# Patient Record
Sex: FEMALE | Race: BLACK OR AFRICAN AMERICAN | NOT HISPANIC OR LATINO | URBAN - METROPOLITAN AREA
[De-identification: names, ages, dates, MRNs, and addresses within clinical notes are randomized per-mention and may not be internally consistent; named-entity substitution may affect disease eponyms.]

---

## 2020-07-22 ENCOUNTER — OFFICE VISIT (OUTPATIENT)
Dept: URBAN - METROPOLITAN AREA CLINIC 113 | Facility: CLINIC | Age: 40
End: 2020-07-22

## 2020-07-25 ENCOUNTER — TELEPHONE ENCOUNTER (OUTPATIENT)
Dept: URBAN - METROPOLITAN AREA CLINIC 13 | Facility: CLINIC | Age: 40
End: 2020-07-25

## 2020-07-26 ENCOUNTER — TELEPHONE ENCOUNTER (OUTPATIENT)
Dept: URBAN - METROPOLITAN AREA CLINIC 13 | Facility: CLINIC | Age: 40
End: 2020-07-26

## 2020-07-26 RX ORDER — METHOCARBAMOL 750 MG/1
TAKE 1 TABLET BY MOUTH EVERY 6 HOURS AS NEEDED TABLET, FILM COATED ORAL
Qty: 20 | Refills: 0 | Status: ACTIVE | COMMUNITY
Start: 2020-04-15

## 2020-07-26 RX ORDER — HYDROXYZINE HYDROCHLORIDE 10 MG/1
TK 1 TO 2 TS PO QHS PRN TABLET ORAL
Qty: 60 | Refills: 0 | Status: ACTIVE | COMMUNITY
Start: 2020-06-29

## 2020-07-26 RX ORDER — OXYCODONE AND ACETAMINOPHEN 5; 325 MG/1; MG/1
TAKE 1 TABLET BY MOUTH EVERY 8 HOURS AS NEEDED FOR PAIN TABLET ORAL
Qty: 90 | Refills: 0 | Status: ACTIVE | COMMUNITY
Start: 2020-07-08

## 2020-07-26 RX ORDER — DICLOFENAC SODIUM 75 MG/1
TAKE 1 TABLET BY MOUTH EVERY 12 HOURS AS NEEDED FOR PAIN TABLET, DELAYED RELEASE ORAL
Qty: 20 | Refills: 0 | Status: ACTIVE | COMMUNITY
Start: 2020-04-15

## 2020-07-26 RX ORDER — ADALIMUMAB 40MG/0.8ML
INJECT 40MG SUBCUTANEOUSLY *ONCE PER WEEK AS DIRECTED KIT SUBCUTANEOUS
Qty: 4 | Refills: 11 | Status: ACTIVE | COMMUNITY

## 2020-07-26 RX ORDER — WHEAT DEXTRIN 3 G/4 G
TAKE 1 TBSP DAILY POWDER (GRAM) ORAL
Refills: 0 | Status: ACTIVE | COMMUNITY
Start: 2008-02-05

## 2020-07-26 RX ORDER — ACETAMINOPHEN AND CODEINE PHOSPHATE 300; 30 MG/1; MG/1
TAKE 1 TABLET BY MOUTH EVERY 6 HOURS AS NEEDED FOR PAIN TABLET ORAL
Qty: 20 | Refills: 0 | Status: ACTIVE | COMMUNITY
Start: 2020-04-15

## 2020-07-26 RX ORDER — HYDROCODONE BITARTRATE AND ACETAMINOPHEN 5; 325 MG/1; MG/1
TAKE 1 TABLET PO EVERY 6 HOURS PRN TABLET ORAL
Qty: 12 | Refills: 0 | Status: ACTIVE | COMMUNITY
Start: 2020-05-12

## 2020-07-26 RX ORDER — CLINDAMYCIN HYDROCHLORIDE 300 MG/1
TAKE 1 CAPSULE BY MOUTH 3 TIMES A DAY FOR 7 DAYS CAPSULE ORAL
Qty: 21 | Refills: 0 | Status: ACTIVE | COMMUNITY
Start: 2020-04-15

## 2020-07-26 RX ORDER — SILVER SULFADIAZINE 10 MG/G
APPLY TO AFFECTED AREA TWICE A DAY CREAM TOPICAL
Qty: 50 | Refills: 0 | Status: ACTIVE | COMMUNITY
Start: 2020-04-15

## 2020-07-26 RX ORDER — DOXYCYCLINE 100 MG/1
TAKE 1 CAPSULE BY MOUTH TWICE A DAY CAPSULE ORAL
Qty: 20 | Refills: 0 | Status: ACTIVE | COMMUNITY
Start: 2020-04-25

## 2020-07-26 RX ORDER — HYDROCORTISONE ACETATE 1500 MG/15G
TAKE RECTALLY ONCE DAILY AEROSOL, FOAM TOPICAL
Refills: 0 | Status: ACTIVE | COMMUNITY
Start: 2009-12-24

## 2020-10-22 ENCOUNTER — OFFICE VISIT (OUTPATIENT)
Dept: URBAN - METROPOLITAN AREA CLINIC 113 | Facility: CLINIC | Age: 40
End: 2020-10-22
Payer: COMMERCIAL

## 2020-10-22 ENCOUNTER — WEB ENCOUNTER (OUTPATIENT)
Dept: URBAN - METROPOLITAN AREA CLINIC 113 | Facility: CLINIC | Age: 40
End: 2020-10-22

## 2020-10-22 VITALS
DIASTOLIC BLOOD PRESSURE: 100 MMHG | RESPIRATION RATE: 18 BRPM | WEIGHT: 188 LBS | HEIGHT: 65 IN | SYSTOLIC BLOOD PRESSURE: 139 MMHG | BODY MASS INDEX: 31.32 KG/M2 | HEART RATE: 108 BPM | TEMPERATURE: 98 F

## 2020-10-22 DIAGNOSIS — K51.30 ULCERATIVE RECTOSIGMOIDITIS WITHOUT COMPLICATION: ICD-10-CM

## 2020-10-22 DIAGNOSIS — L88 PYODERMA GANGRENOSUM: ICD-10-CM

## 2020-10-22 DIAGNOSIS — K59.01 SLOW TRANSIT CONSTIPATION: ICD-10-CM

## 2020-10-22 PROCEDURE — 99213 OFFICE O/P EST LOW 20 MIN: CPT | Performed by: INTERNAL MEDICINE

## 2020-10-22 RX ORDER — LISDEXAMFETAMINE DIMESYLATE 30 MG/1
1 CAPSULE IN THE MORNING CAPSULE ORAL ONCE A DAY
Status: ACTIVE | COMMUNITY

## 2020-10-22 RX ORDER — OXYCODONE AND ACETAMINOPHEN 5; 325 MG/1; MG/1
TAKE 1 TABLET BY MOUTH EVERY 8 HOURS AS NEEDED FOR PAIN TABLET ORAL
Qty: 90 | Refills: 0 | Status: ACTIVE | COMMUNITY
Start: 2020-07-08

## 2020-10-22 RX ORDER — ADALIMUMAB 40MG/0.8ML
INJECT 40MG SUBCUTANEOUSLY *ONCE PER WEEK AS DIRECTED KIT SUBCUTANEOUS
Qty: 4 | Refills: 11 | Status: ACTIVE | COMMUNITY

## 2020-10-22 RX ORDER — SPIRONOLACTONE 100 MG/1
1 TABLET TABLET, FILM COATED ORAL ONCE A DAY
Status: ACTIVE | COMMUNITY

## 2020-10-22 RX ORDER — ZOLPIDEM TARTRATE 10 MG/1
1 TABLET AT BEDTIME AS NEEDED TABLET, FILM COATED ORAL ONCE A DAY
Status: ACTIVE | COMMUNITY

## 2020-10-22 RX ORDER — DAPSONE 100 MG/1
1 TABLET TABLET ORAL ONCE A DAY
Status: ACTIVE | COMMUNITY

## 2020-10-22 RX ORDER — DOXYCYCLINE 100 MG/1
TAKE 1 CAPSULE BY MOUTH TWICE A DAY CAPSULE ORAL
Qty: 20 | Refills: 0 | Status: ACTIVE | COMMUNITY
Start: 2020-04-25

## 2020-10-22 RX ORDER — ADALIMUMAB 40MG/0.8ML
INJECT 40MG SUBCUTANEOUSLY *ONCE PER WEEK AS DIRECTED KIT SUBCUTANEOUS
OUTPATIENT

## 2020-10-22 RX ORDER — DROSPIRENONE AND ETHINYL ESTRADIOL 0.02-3(28)
1 TABLET KIT ORAL ONCE A DAY
Status: ACTIVE | COMMUNITY

## 2020-10-22 NOTE — PHYSICAL EXAM GASTROINTESTINAL
Message sent via [employer].      Estrella Laura CMA  [facility]  [place]       soft, nontender, nondistended , normal bowel sounds

## 2020-10-22 NOTE — HPI-OTHER HISTORIES
Colonoscopy (1/7/10): diffuse rectosigmoid inflammation, biopsied showing chronic acitve colitis with ulceration, no dysplasia.

## 2020-10-22 NOTE — HPI-TODAY'S VISIT:
Ms. Medel is a 40-year-old woman with ulcerative proctitis on Humria presenting for follow-up regarding constipation. She was initially seen on 7/22/2020 for ulcerative colitis that was asymptomatic on Humira 40 mg qweek started for the treatment of pyoderma gangrenosum.  She was having nausea at that time she was recommended to get labs and a right upper quadrant ultrasound.  Abdominal ultrasound on 7/31/2020 was normal. Since her last appointment she reports issues of irregular bowel movements where she will only have 1 bowel movement weekly.  She was evaluated in the emergency department in May for evaluation of left leg pain secondary to pyoderma gangrenosum.  She is followed by Dr. Zapata dermatology treated with prednisone, clindamycin and dapsone, then started on Humira 40 mg every week.  She has tried taking MiraLAX daily in the past without any improvement.  She denies any red blood per rectum or abdominal pain.  She also denies any nausea, vomiting, heartburn or difficulty swallowing.

## 2020-11-14 ENCOUNTER — TELEPHONE ENCOUNTER (OUTPATIENT)
Dept: URBAN - METROPOLITAN AREA CLINIC 113 | Facility: CLINIC | Age: 40
End: 2020-11-14

## 2021-02-02 ENCOUNTER — OFFICE VISIT (OUTPATIENT)
Dept: URBAN - METROPOLITAN AREA CLINIC 113 | Facility: CLINIC | Age: 41
End: 2021-02-02
Payer: COMMERCIAL

## 2021-02-02 ENCOUNTER — WEB ENCOUNTER (OUTPATIENT)
Dept: URBAN - METROPOLITAN AREA CLINIC 113 | Facility: CLINIC | Age: 41
End: 2021-02-02

## 2021-02-02 VITALS
HEIGHT: 65 IN | HEART RATE: 98 BPM | BODY MASS INDEX: 30.82 KG/M2 | WEIGHT: 185 LBS | TEMPERATURE: 97.1 F | DIASTOLIC BLOOD PRESSURE: 86 MMHG | SYSTOLIC BLOOD PRESSURE: 130 MMHG

## 2021-02-02 DIAGNOSIS — K59.01 SLOW TRANSIT CONSTIPATION: ICD-10-CM

## 2021-02-02 DIAGNOSIS — L88 PYODERMA GANGRENOSUM: ICD-10-CM

## 2021-02-02 DIAGNOSIS — K51.30 ULCERATIVE RECTOSIGMOIDITIS WITHOUT COMPLICATION: ICD-10-CM

## 2021-02-02 DIAGNOSIS — Z12.11 COLON CANCER SCREENING: ICD-10-CM

## 2021-02-02 DIAGNOSIS — D75.89 MACROCYTOSIS: ICD-10-CM

## 2021-02-02 PROCEDURE — 99214 OFFICE O/P EST MOD 30 MIN: CPT | Performed by: INTERNAL MEDICINE

## 2021-02-02 RX ORDER — DAPSONE 100 MG/1
1 TABLET TABLET ORAL ONCE A DAY
Status: ACTIVE | COMMUNITY

## 2021-02-02 RX ORDER — OXYCODONE AND ACETAMINOPHEN 5; 325 MG/1; MG/1
TAKE 1 TABLET BY MOUTH EVERY 8 HOURS AS NEEDED FOR PAIN TABLET ORAL
Qty: 90 | Refills: 0 | Status: ACTIVE | COMMUNITY
Start: 2020-07-08

## 2021-02-02 RX ORDER — LINACLOTIDE 145 UG/1
1 CAPSULE AT LEAST 30 MINUTES BEFORE THE FIRST MEAL OF THE DAY ON AN EMPTY STOMACH CAPSULE, GELATIN COATED ORAL ONCE A DAY
Qty: 30 | OUTPATIENT
Start: 2021-02-02 | End: 2021-03-04

## 2021-02-02 RX ORDER — SPIRONOLACTONE 100 MG/1
1 TABLET TABLET, FILM COATED ORAL ONCE A DAY
Status: ACTIVE | COMMUNITY

## 2021-02-02 RX ORDER — HYDROXYZINE HYDROCHLORIDE 10 MG/1
1 TABLET TABLET, FILM COATED ORAL ONCE DAILY
Status: ACTIVE | COMMUNITY

## 2021-02-02 RX ORDER — ZOLPIDEM TARTRATE 10 MG/1
1 TABLET AT BEDTIME AS NEEDED TABLET, FILM COATED ORAL ONCE A DAY
Status: ACTIVE | COMMUNITY

## 2021-02-02 RX ORDER — CHLORTHALIDONE 25 MG/1
1 TABLET IN THE MORNING WITH FOOD TABLET ORAL ONCE A DAY
Status: ACTIVE | COMMUNITY

## 2021-02-02 RX ORDER — GABAPENTIN 300 MG/1
3 CAPSULE CAPSULE ORAL ONCE A DAY
Status: ACTIVE | COMMUNITY

## 2021-02-02 RX ORDER — LISDEXAMFETAMINE DIMESYLATE 30 MG/1
1 CAPSULE IN THE MORNING CAPSULE ORAL ONCE A DAY
Status: ACTIVE | COMMUNITY

## 2021-02-02 RX ORDER — ADALIMUMAB 40MG/0.8ML
INJECT 40MG SUBCUTANEOUSLY *ONCE PER WEEK AS DIRECTED KIT SUBCUTANEOUS
Status: ACTIVE | COMMUNITY

## 2021-02-02 RX ORDER — ADALIMUMAB 40MG/0.8ML
INJECT 40MG SUBCUTANEOUSLY *ONCE PER WEEK AS DIRECTED KIT SUBCUTANEOUS
OUTPATIENT

## 2021-02-02 RX ORDER — BUSPIRONE HYDROCHLORIDE 10 MG/1
1 TABLET PRN TABLET ORAL
Status: ACTIVE | COMMUNITY

## 2021-02-02 RX ORDER — DOXYCYCLINE 100 MG/1
TAKE 1 CAPSULE BY MOUTH TWICE A DAY CAPSULE ORAL
Qty: 20 | Refills: 0 | Status: ACTIVE | COMMUNITY
Start: 2020-04-25

## 2021-02-02 NOTE — HPI-TODAY'S VISIT:
Ms. Medel is a 40-year-old woman with a history of ulcerative proctitis, hidradenitis suppurativa, pyoderma gangrenosum on Humira 40 mg weekly and constipation presenting for follow-up. She was last seen on 10/22/2020 for follow-up regarding her chronic ulcerative rectosigmoiditis and pyoderma gangrenosum and was in clinical remission on Humira 40 mg weekly, and constipation was treated with Linzess 145 mcg daily. She returns for follow-up reporting that she is overall doing much better.  Her constipation is controlled on Linzess 145 mcg daily.  She has not had any issues with her pyoderma gangrenosum.  Her hidradenitis suppurativa is also improved. she denies any red blood per rectum, joint aches, unintentional weight loss.  She also denies any nausea, vomiting, heartburn or difficulty swallowing.  She reports having some fatigue recently. Labs on 12/10/2020 showed normal basic metabolic panel, normal liver function tests, TSH 0.297. Labs on 10/19/2020 show WBC 5.5, hemoglobin 12.5, , platelets 282

## 2021-02-09 ENCOUNTER — TELEPHONE ENCOUNTER (OUTPATIENT)
Dept: URBAN - METROPOLITAN AREA CLINIC 113 | Facility: CLINIC | Age: 41
End: 2021-02-09

## 2021-02-09 RX ORDER — BUSPIRONE HYDROCHLORIDE 10 MG/1
1 TABLET PRN TABLET ORAL
Status: ACTIVE | COMMUNITY

## 2021-02-09 RX ORDER — SPIRONOLACTONE 100 MG/1
1 TABLET TABLET, FILM COATED ORAL ONCE A DAY
Status: ACTIVE | COMMUNITY

## 2021-02-09 RX ORDER — ZOLPIDEM TARTRATE 10 MG/1
1 TABLET AT BEDTIME AS NEEDED TABLET, FILM COATED ORAL ONCE A DAY
Status: ACTIVE | COMMUNITY

## 2021-02-09 RX ORDER — HYDROXYZINE HYDROCHLORIDE 10 MG/1
1 TABLET TABLET, FILM COATED ORAL ONCE DAILY
Status: ACTIVE | COMMUNITY

## 2021-02-09 RX ORDER — LISDEXAMFETAMINE DIMESYLATE 30 MG/1
1 CAPSULE IN THE MORNING CAPSULE ORAL ONCE A DAY
Status: ACTIVE | COMMUNITY

## 2021-02-09 RX ORDER — OXYCODONE AND ACETAMINOPHEN 5; 325 MG/1; MG/1
TAKE 1 TABLET BY MOUTH EVERY 8 HOURS AS NEEDED FOR PAIN TABLET ORAL
Qty: 90 | Refills: 0 | Status: ACTIVE | COMMUNITY
Start: 2020-07-08

## 2021-02-09 RX ORDER — DOXYCYCLINE 100 MG/1
TAKE 1 CAPSULE BY MOUTH TWICE A DAY CAPSULE ORAL
Qty: 20 | Refills: 0 | Status: ACTIVE | COMMUNITY
Start: 2020-04-25

## 2021-02-09 RX ORDER — CHLORTHALIDONE 25 MG/1
1 TABLET IN THE MORNING WITH FOOD TABLET ORAL ONCE A DAY
Status: ACTIVE | COMMUNITY

## 2021-02-09 RX ORDER — ADALIMUMAB 40MG/0.8ML
INJECT 40MG SUBCUTANEOUSLY *ONCE PER WEEK AS DIRECTED KIT SUBCUTANEOUS
Status: ACTIVE | COMMUNITY

## 2021-02-09 RX ORDER — GABAPENTIN 300 MG/1
3 CAPSULE CAPSULE ORAL ONCE A DAY
Status: ACTIVE | COMMUNITY

## 2021-02-09 RX ORDER — LINACLOTIDE 145 UG/1
1 CAPSULE AT LEAST 30 MINUTES BEFORE THE FIRST MEAL OF THE DAY ON AN EMPTY STOMACH CAPSULE, GELATIN COATED ORAL ONCE A DAY
Qty: 30 | Status: ACTIVE | COMMUNITY
Start: 2021-02-02 | End: 2021-03-04

## 2021-02-09 RX ORDER — DAPSONE 100 MG/1
1 TABLET TABLET ORAL ONCE A DAY
Status: ACTIVE | COMMUNITY

## 2021-03-22 ENCOUNTER — LAB OUTSIDE AN ENCOUNTER (OUTPATIENT)
Dept: URBAN - METROPOLITAN AREA CLINIC 113 | Facility: CLINIC | Age: 41
End: 2021-03-22

## 2021-06-07 ENCOUNTER — TELEPHONE ENCOUNTER (OUTPATIENT)
Dept: URBAN - METROPOLITAN AREA CLINIC 113 | Facility: CLINIC | Age: 41
End: 2021-06-07

## 2021-06-29 ENCOUNTER — OFFICE VISIT (OUTPATIENT)
Dept: URBAN - METROPOLITAN AREA CLINIC 113 | Facility: CLINIC | Age: 41
End: 2021-06-29
Payer: COMMERCIAL

## 2021-06-29 VITALS
BODY MASS INDEX: 29.99 KG/M2 | RESPIRATION RATE: 20 BRPM | TEMPERATURE: 97.3 F | SYSTOLIC BLOOD PRESSURE: 128 MMHG | HEART RATE: 96 BPM | DIASTOLIC BLOOD PRESSURE: 91 MMHG | HEIGHT: 65 IN | WEIGHT: 180 LBS

## 2021-06-29 DIAGNOSIS — K59.01 SLOW TRANSIT CONSTIPATION: ICD-10-CM

## 2021-06-29 DIAGNOSIS — Z12.11 COLON CANCER SCREENING: ICD-10-CM

## 2021-06-29 DIAGNOSIS — K51.30 ULCERATIVE RECTOSIGMOIDITIS WITHOUT COMPLICATION: ICD-10-CM

## 2021-06-29 DIAGNOSIS — D75.89 MACROCYTOSIS: ICD-10-CM

## 2021-06-29 DIAGNOSIS — L88 PYODERMA GANGRENOSUM: ICD-10-CM

## 2021-06-29 PROCEDURE — 99214 OFFICE O/P EST MOD 30 MIN: CPT | Performed by: INTERNAL MEDICINE

## 2021-06-29 RX ORDER — LINACLOTIDE 145 UG/1
1 CAPSULE AT LEAST 30 MINUTES BEFORE THE FIRST MEAL OF THE DAY ON AN EMPTY STOMACH CAPSULE, GELATIN COATED ORAL ONCE A DAY
Qty: 90 | Refills: 3 | OUTPATIENT
Start: 2021-06-29 | End: 2022-06-24

## 2021-06-29 RX ORDER — ZOLPIDEM TARTRATE 10 MG/1
1 TABLET AT BEDTIME AS NEEDED TABLET, FILM COATED ORAL ONCE A DAY
Status: ACTIVE | COMMUNITY

## 2021-06-29 RX ORDER — ADALIMUMAB 40MG/0.8ML
INJECT 40MG SUBCUTANEOUSLY *ONCE PER WEEK AS DIRECTED KIT SUBCUTANEOUS
Status: ACTIVE | COMMUNITY

## 2021-06-29 RX ORDER — LISDEXAMFETAMINE DIMESYLATE 30 MG/1
1 CAPSULE IN THE MORNING CAPSULE ORAL ONCE A DAY
Status: ACTIVE | COMMUNITY

## 2021-06-29 RX ORDER — BUSPIRONE HYDROCHLORIDE 10 MG/1
1 TABLET PRN TABLET ORAL
Status: ACTIVE | COMMUNITY

## 2021-06-29 RX ORDER — LINACLOTIDE 145 UG/1
1 CAPSULE AT LEAST 30 MINUTES BEFORE THE FIRST MEAL OF THE DAY ON AN EMPTY STOMACH CAPSULE, GELATIN COATED ORAL ONCE A DAY
Qty: 90 | Refills: 3 | OUTPATIENT
End: 2021-06-29

## 2021-06-29 RX ORDER — DOXYCYCLINE 100 MG/1
TAKE 1 CAPSULE BY MOUTH TWICE A DAY CAPSULE ORAL
Qty: 20 | Refills: 0 | Status: ACTIVE | COMMUNITY
Start: 2020-04-25

## 2021-06-29 RX ORDER — ADALIMUMAB 40MG/0.8ML
INJECT 40MG SUBCUTANEOUSLY *ONCE PER WEEK AS DIRECTED KIT SUBCUTANEOUS
OUTPATIENT

## 2021-06-29 RX ORDER — OXYCODONE AND ACETAMINOPHEN 5; 325 MG/1; MG/1
TAKE 1 TABLET BY MOUTH EVERY 8 HOURS AS NEEDED FOR PAIN TABLET ORAL
Qty: 90 | Refills: 0 | Status: ON HOLD | COMMUNITY
Start: 2020-07-08

## 2021-06-29 RX ORDER — HYDROXYZINE HYDROCHLORIDE 10 MG/1
1 TABLET TABLET, FILM COATED ORAL ONCE DAILY
Status: ACTIVE | COMMUNITY

## 2021-06-29 RX ORDER — GABAPENTIN 300 MG/1
3 CAPSULE CAPSULE ORAL ONCE A DAY
Status: ACTIVE | COMMUNITY

## 2021-06-29 RX ORDER — DAPSONE 100 MG/1
1 TABLET TABLET ORAL ONCE A DAY
Status: ACTIVE | COMMUNITY

## 2021-06-29 RX ORDER — CHLORTHALIDONE 25 MG/1
1 TABLET IN THE MORNING WITH FOOD TABLET ORAL ONCE A DAY
Status: ACTIVE | COMMUNITY

## 2021-06-29 RX ORDER — SPIRONOLACTONE 100 MG/1
1 TABLET TABLET, FILM COATED ORAL ONCE A DAY
Status: ACTIVE | COMMUNITY

## 2021-06-29 NOTE — HPI-TODAY'S VISIT:
Ms. Medel is a 40-year-old woman with a history of ulcerative proctitis, hidradenitis suppurativa, pyoderma gangrenosum on Humira 40 mg weekly and constipation presenting for follow-up. She was last seen on 2/02/2021 for follow up regarding her ulcerative rectosigmoiditis. She was doing clinically well on Humira 40 mg weekly. Constipation was doing well on Linzess 145 mcg. Repeat labs were ordered. Labs (3/12/2021): CBC showed Hgb 11,  RBC 3.59, Hct 36.2, plts 276. CMP unremarkable including normal LFTs.  Vitamin D 25.6. Folate 11.70  She returns for follow-up, reporting recent exacerbation in constipation.  Her last "good" bowel movement was 3 to 4 days ago.  She has had to use an enema over the past 2 days to have a bowel movement. Denies improvements with Metamucil and MiraLAX.  She denies red blood per rectum, melena hematochezia.  She also reports increased fatigue and low energy.  Her energy levels are at baseline in the morning, but progressively worsen throughout the day.  She gets 5 to 7 hours of sleep each night with Ambien. She also reports a 40lb weight gain over the past 6 years. She reports failed efforts at weight loss, although she eats healthy. Denies daily exercise secondary to low energy. She also denies any nausea, vomiting, heartburn, or difficulty swallowing.  For the past month she has been using weekly B12Rx injections for hopeful improvements in low energy. This has slightly improved her fatigue, but questions if she should be receiving [id number] injections from her PCP.

## 2021-07-24 PROBLEM — 74578003: Status: ACTIVE | Noted: 2020-11-14

## 2021-09-03 ENCOUNTER — OFFICE VISIT (OUTPATIENT)
Dept: URBAN - METROPOLITAN AREA CLINIC 113 | Facility: CLINIC | Age: 41
End: 2021-09-03
Payer: COMMERCIAL

## 2021-09-03 VITALS
HEIGHT: 65 IN | TEMPERATURE: 97.8 F | BODY MASS INDEX: 28.99 KG/M2 | SYSTOLIC BLOOD PRESSURE: 117 MMHG | HEART RATE: 84 BPM | WEIGHT: 174 LBS | RESPIRATION RATE: 18 BRPM | DIASTOLIC BLOOD PRESSURE: 77 MMHG

## 2021-09-03 DIAGNOSIS — K59.01 SLOW TRANSIT CONSTIPATION: ICD-10-CM

## 2021-09-03 DIAGNOSIS — K51.30 ULCERATIVE RECTOSIGMOIDITIS WITHOUT COMPLICATION: ICD-10-CM

## 2021-09-03 DIAGNOSIS — Z12.11 COLON CANCER SCREENING: ICD-10-CM

## 2021-09-03 DIAGNOSIS — D75.89 MACROCYTOSIS: ICD-10-CM

## 2021-09-03 PROCEDURE — 99214 OFFICE O/P EST MOD 30 MIN: CPT | Performed by: INTERNAL MEDICINE

## 2021-09-03 RX ORDER — OXYCODONE AND ACETAMINOPHEN 5; 325 MG/1; MG/1
TAKE 1 TABLET BY MOUTH EVERY 8 HOURS AS NEEDED FOR PAIN TABLET ORAL
Qty: 90 | Refills: 0 | Status: ON HOLD | COMMUNITY
Start: 2020-07-08

## 2021-09-03 RX ORDER — DAPSONE 100 MG/1
1 TABLET TABLET ORAL ONCE A DAY
Status: ACTIVE | COMMUNITY

## 2021-09-03 RX ORDER — BUSPIRONE HYDROCHLORIDE 10 MG/1
1 TABLET PRN TABLET ORAL
Status: ACTIVE | COMMUNITY

## 2021-09-03 RX ORDER — LISDEXAMFETAMINE DIMESYLATE 30 MG/1
1 CAPSULE IN THE MORNING CAPSULE ORAL ONCE A DAY
Status: ACTIVE | COMMUNITY

## 2021-09-03 RX ORDER — LINACLOTIDE 145 UG/1
1 CAPSULE AT LEAST 30 MINUTES BEFORE THE FIRST MEAL OF THE DAY ON AN EMPTY STOMACH CAPSULE, GELATIN COATED ORAL ONCE A DAY
OUTPATIENT
Start: 2021-06-29

## 2021-09-03 RX ORDER — ZOLPIDEM TARTRATE 10 MG/1
1 TABLET AT BEDTIME AS NEEDED TABLET, FILM COATED ORAL ONCE A DAY
Status: ACTIVE | COMMUNITY

## 2021-09-03 RX ORDER — SPIRONOLACTONE 100 MG/1
1 TABLET TABLET, FILM COATED ORAL ONCE A DAY
Status: ACTIVE | COMMUNITY

## 2021-09-03 RX ORDER — LINACLOTIDE 145 UG/1
1 CAPSULE AT LEAST 30 MINUTES BEFORE THE FIRST MEAL OF THE DAY ON AN EMPTY STOMACH CAPSULE, GELATIN COATED ORAL ONCE A DAY
Qty: 90 | Refills: 3 | Status: ACTIVE | COMMUNITY
Start: 2021-06-29 | End: 2022-06-24

## 2021-09-03 RX ORDER — ADALIMUMAB 40MG/0.8ML
INJECT 40MG SUBCUTANEOUSLY *ONCE PER WEEK AS DIRECTED KIT SUBCUTANEOUS
OUTPATIENT

## 2021-09-03 RX ORDER — CHLORTHALIDONE 25 MG/1
1 TABLET IN THE MORNING WITH FOOD TABLET ORAL ONCE A DAY
Status: ACTIVE | COMMUNITY

## 2021-09-03 RX ORDER — DOXYCYCLINE 100 MG/1
TAKE 1 CAPSULE BY MOUTH TWICE A DAY CAPSULE ORAL
Qty: 20 | Refills: 0 | Status: ACTIVE | COMMUNITY
Start: 2020-04-25

## 2021-09-03 RX ORDER — ADALIMUMAB 40MG/0.8ML
INJECT 40MG SUBCUTANEOUSLY *ONCE PER WEEK AS DIRECTED KIT SUBCUTANEOUS
Status: ACTIVE | COMMUNITY

## 2021-09-03 RX ORDER — GABAPENTIN 300 MG/1
3 CAPSULE CAPSULE ORAL ONCE A DAY
Status: ACTIVE | COMMUNITY

## 2021-09-03 RX ORDER — HYDROXYZINE HYDROCHLORIDE 10 MG/1
1 TABLET TABLET, FILM COATED ORAL ONCE DAILY
Status: ACTIVE | COMMUNITY

## 2021-09-03 RX ORDER — SODIUM, POTASSIUM,MAG SULFATES 17.5-3.13G
354 ML SOLUTION, RECONSTITUTED, ORAL ORAL ONCE
Qty: 354 ML | Refills: 0 | OUTPATIENT
Start: 2021-09-05 | End: 2021-09-06

## 2021-09-03 NOTE — HPI-TODAY'S VISIT:
Ms. Medel is a 41-year-old woman with a history of ulcerative proctitis, hidradenitis suppurativa, pyoderma gangrenosum on Humira 40 mg weekly and constipation, presenting for follow-up. She was last seen in this office on 6/29/2021 for follow-up with primary complaints of constipation.  She also reported complaints of generalized fatigue and weight gain.  She was interested in B12 injections.  She was advised to follow-up with her primary care provider regarding vitamin B12 injections.  Regarding her constipation, she was instructed to trial Linzess 145 mcg and MiraLAX 15 to 30 cc once daily. She returns today and states that she has been doing well since her last office visit.  She is compliant with Linzess daily and is having 1-2 soft, formed bowel movements daily.  There is no red blood per rectum, melena or hematochezia.  On occasion, she has dark stools which she attributes to oral iron supplementation.  She still reports complaints of fatigue which she attributes to her history of anemia.  She believes she may need to begin iron infusions for hopeful improvements in her anemia. She denies any difficulty swallowing, heartburn/acid reflux or regurgitation.  Her weight remains stable. Since her last office visit, she does report increasing difficulty with carpal tunnel.  She is scheduled to have surgery with Ortho within the next coming months.

## 2021-09-04 LAB
A/G RATIO: 1.6
ALBUMIN: 3.9
ALKALINE PHOSPHATASE: 54
ALT (SGPT): 11
AST (SGOT): 24
BASO (ABSOLUTE): 0
BASOS: 0
BILIRUBIN, TOTAL: 0.2
BUN/CREATININE RATIO: 16
BUN: 14
CALCIUM: 9.4
CARBON DIOXIDE, TOTAL: 26
CHLORIDE: 105
CREATININE: 0.89
EGFR IF AFRICN AM: 93
EGFR IF NONAFRICN AM: 81
EOS (ABSOLUTE): 0.1
EOS: 1
FERRITIN, SERUM: 77
GLOBULIN, TOTAL: 2.4
GLUCOSE: 78
HEMATOCRIT: 32.3
HEMATOLOGY COMMENTS:: (no result)
HEMOGLOBIN: 10.6
IMMATURE CELLS: (no result)
IMMATURE GRANS (ABS): 0
IMMATURE GRANULOCYTES: 0
IRON BIND.CAP.(TIBC): 258
IRON SATURATION: 36
IRON: 92
LYMPHS (ABSOLUTE): 2.1
LYMPHS: 41
MCH: 33.4
MCHC: 32.8
MCV: 102
MONOCYTES(ABSOLUTE): 0.4
MONOCYTES: 8
NEUTROPHILS (ABSOLUTE): 2.5
NEUTROPHILS: 50
NRBC: (no result)
PLATELETS: 231
POTASSIUM: 4.6
PROTEIN, TOTAL: 6.3
RBC: 3.17
RDW: 11.6
SODIUM: 142
UIBC: 166
VITAMIN D, 25-HYDROXY: 27.6
WBC: 5.1

## 2021-09-05 ENCOUNTER — LAB OUTSIDE AN ENCOUNTER (OUTPATIENT)
Dept: URBAN - METROPOLITAN AREA CLINIC 113 | Facility: CLINIC | Age: 41
End: 2021-09-05

## 2021-09-06 PROBLEM — 397073000: Status: ACTIVE | Noted: 2021-02-02

## 2021-09-27 ENCOUNTER — TELEPHONE ENCOUNTER (OUTPATIENT)
Dept: URBAN - METROPOLITAN AREA CLINIC 113 | Facility: CLINIC | Age: 41
End: 2021-09-27

## 2021-09-27 RX ORDER — LINACLOTIDE 145 UG/1
1 CAPSULE AT LEAST 30 MINUTES BEFORE THE FIRST MEAL OF THE DAY ON AN EMPTY STOMACH CAPSULE, GELATIN COATED ORAL ONCE A DAY
Status: ACTIVE | COMMUNITY
Start: 2021-06-29

## 2021-09-27 RX ORDER — OXYCODONE AND ACETAMINOPHEN 5; 325 MG/1; MG/1
TAKE 1 TABLET BY MOUTH EVERY 8 HOURS AS NEEDED FOR PAIN TABLET ORAL
Qty: 90 | Refills: 0 | Status: ON HOLD | COMMUNITY
Start: 2020-07-08

## 2021-09-27 RX ORDER — ZOLPIDEM TARTRATE 10 MG/1
1 TABLET AT BEDTIME AS NEEDED TABLET, FILM COATED ORAL ONCE A DAY
Status: ACTIVE | COMMUNITY

## 2021-09-27 RX ORDER — BUSPIRONE HYDROCHLORIDE 10 MG/1
1 TABLET PRN TABLET ORAL
Status: ACTIVE | COMMUNITY

## 2021-09-27 RX ORDER — CHLORTHALIDONE 25 MG/1
1 TABLET IN THE MORNING WITH FOOD TABLET ORAL ONCE A DAY
Status: ACTIVE | COMMUNITY

## 2021-09-27 RX ORDER — ADALIMUMAB 40MG/0.8ML
INJECT 40MG SUBCUTANEOUSLY *ONCE PER WEEK AS DIRECTED KIT SUBCUTANEOUS
Status: ACTIVE | COMMUNITY

## 2021-09-27 RX ORDER — GABAPENTIN 300 MG/1
3 CAPSULE CAPSULE ORAL ONCE A DAY
Status: ACTIVE | COMMUNITY

## 2021-09-27 RX ORDER — SPIRONOLACTONE 100 MG/1
1 TABLET TABLET, FILM COATED ORAL ONCE A DAY
Status: ACTIVE | COMMUNITY

## 2021-09-27 RX ORDER — DAPSONE 100 MG/1
1 TABLET TABLET ORAL ONCE A DAY
Status: ACTIVE | COMMUNITY

## 2021-09-27 RX ORDER — POLYETHYLENE GLYCOL 3350, SODIUM CHLORIDE, SODIUM BICARBONATE, POTASSIUM CHLORIDE 420; 11.2; 5.72; 1.48 G/4L; G/4L; G/4L; G/4L
AS DIRECTED POWDER, FOR SOLUTION ORAL ONCE
Qty: 420 GM | Refills: 0 | OUTPATIENT
Start: 2021-09-27 | End: 2021-09-28

## 2021-09-27 RX ORDER — HYDROXYZINE HYDROCHLORIDE 10 MG/1
1 TABLET TABLET, FILM COATED ORAL ONCE DAILY
Status: ACTIVE | COMMUNITY

## 2021-09-27 RX ORDER — LISDEXAMFETAMINE DIMESYLATE 30 MG/1
1 CAPSULE IN THE MORNING CAPSULE ORAL ONCE A DAY
Status: ACTIVE | COMMUNITY

## 2021-09-27 RX ORDER — DOXYCYCLINE 100 MG/1
TAKE 1 CAPSULE BY MOUTH TWICE A DAY CAPSULE ORAL
Qty: 20 | Refills: 0 | Status: ACTIVE | COMMUNITY
Start: 2020-04-25

## 2021-09-28 PROBLEM — 305058001: Status: ACTIVE | Noted: 2021-03-01

## 2021-10-18 ENCOUNTER — OFFICE VISIT (OUTPATIENT)
Dept: URBAN - METROPOLITAN AREA SURGERY CENTER 25 | Facility: SURGERY CENTER | Age: 41
End: 2021-10-18
Payer: COMMERCIAL

## 2021-10-18 DIAGNOSIS — K51.50 LEFT SIDED ULCERATIVE (CHRONIC) COLITIS: ICD-10-CM

## 2021-10-18 PROCEDURE — 45378 DIAGNOSTIC COLONOSCOPY: CPT | Performed by: INTERNAL MEDICINE

## 2021-10-18 PROCEDURE — G8907 PT DOC NO EVENTS ON DISCHARG: HCPCS | Performed by: INTERNAL MEDICINE

## 2021-10-18 RX ORDER — GABAPENTIN 300 MG/1
3 CAPSULE CAPSULE ORAL ONCE A DAY
Status: ACTIVE | COMMUNITY

## 2021-10-18 RX ORDER — LINACLOTIDE 145 UG/1
1 CAPSULE AT LEAST 30 MINUTES BEFORE THE FIRST MEAL OF THE DAY ON AN EMPTY STOMACH CAPSULE, GELATIN COATED ORAL ONCE A DAY
Status: ACTIVE | COMMUNITY
Start: 2021-06-29

## 2021-10-18 RX ORDER — DAPSONE 100 MG/1
1 TABLET TABLET ORAL ONCE A DAY
Status: ACTIVE | COMMUNITY

## 2021-10-18 RX ORDER — SPIRONOLACTONE 100 MG/1
1 TABLET TABLET, FILM COATED ORAL ONCE A DAY
Status: ACTIVE | COMMUNITY

## 2021-10-18 RX ORDER — DOXYCYCLINE 100 MG/1
TAKE 1 CAPSULE BY MOUTH TWICE A DAY CAPSULE ORAL
Qty: 20 | Refills: 0 | Status: ACTIVE | COMMUNITY
Start: 2020-04-25

## 2021-10-18 RX ORDER — OXYCODONE AND ACETAMINOPHEN 5; 325 MG/1; MG/1
TAKE 1 TABLET BY MOUTH EVERY 8 HOURS AS NEEDED FOR PAIN TABLET ORAL
Qty: 90 | Refills: 0 | Status: ON HOLD | COMMUNITY
Start: 2020-07-08

## 2021-10-18 RX ORDER — LISDEXAMFETAMINE DIMESYLATE 30 MG/1
1 CAPSULE IN THE MORNING CAPSULE ORAL ONCE A DAY
Status: ACTIVE | COMMUNITY

## 2021-10-18 RX ORDER — HYDROXYZINE HYDROCHLORIDE 10 MG/1
1 TABLET TABLET, FILM COATED ORAL ONCE DAILY
Status: ACTIVE | COMMUNITY

## 2021-10-18 RX ORDER — BUSPIRONE HYDROCHLORIDE 10 MG/1
1 TABLET PRN TABLET ORAL
Status: ACTIVE | COMMUNITY

## 2021-10-18 RX ORDER — ZOLPIDEM TARTRATE 10 MG/1
1 TABLET AT BEDTIME AS NEEDED TABLET, FILM COATED ORAL ONCE A DAY
Status: ACTIVE | COMMUNITY

## 2021-10-18 RX ORDER — ADALIMUMAB 40MG/0.8ML
INJECT 40MG SUBCUTANEOUSLY *ONCE PER WEEK AS DIRECTED KIT SUBCUTANEOUS
Status: ACTIVE | COMMUNITY

## 2021-10-18 RX ORDER — CHLORTHALIDONE 25 MG/1
1 TABLET IN THE MORNING WITH FOOD TABLET ORAL ONCE A DAY
Status: ACTIVE | COMMUNITY

## 2021-11-10 ENCOUNTER — OFFICE VISIT (OUTPATIENT)
Dept: URBAN - METROPOLITAN AREA CLINIC 113 | Facility: CLINIC | Age: 41
End: 2021-11-10
Payer: COMMERCIAL

## 2021-11-10 VITALS
SYSTOLIC BLOOD PRESSURE: 118 MMHG | HEART RATE: 86 BPM | TEMPERATURE: 97.5 F | WEIGHT: 170 LBS | HEIGHT: 65 IN | BODY MASS INDEX: 28.32 KG/M2 | RESPIRATION RATE: 18 BRPM | DIASTOLIC BLOOD PRESSURE: 85 MMHG

## 2021-11-10 DIAGNOSIS — K51.30 ULCERATIVE RECTOSIGMOIDITIS WITHOUT COMPLICATION: ICD-10-CM

## 2021-11-10 DIAGNOSIS — K59.01 SLOW TRANSIT CONSTIPATION: ICD-10-CM

## 2021-11-10 PROBLEM — 41364008: Status: ACTIVE | Noted: 2020-10-22

## 2021-11-10 PROBLEM — 35298007: Status: ACTIVE | Noted: 2020-10-22

## 2021-11-10 PROCEDURE — 99214 OFFICE O/P EST MOD 30 MIN: CPT | Performed by: PHYSICIAN ASSISTANT

## 2021-11-10 RX ORDER — ZOLPIDEM TARTRATE 10 MG/1
1 TABLET AT BEDTIME AS NEEDED TABLET, FILM COATED ORAL ONCE A DAY
Status: ACTIVE | COMMUNITY

## 2021-11-10 RX ORDER — ADALIMUMAB 40MG/0.8ML
INJECT 40MG SUBCUTANEOUSLY *ONCE PER WEEK AS DIRECTED KIT SUBCUTANEOUS
Status: ACTIVE | COMMUNITY

## 2021-11-10 RX ORDER — CHLORTHALIDONE 25 MG/1
1 TABLET IN THE MORNING WITH FOOD TABLET ORAL ONCE A DAY
Status: ACTIVE | COMMUNITY

## 2021-11-10 RX ORDER — OXYCODONE AND ACETAMINOPHEN 5; 325 MG/1; MG/1
TAKE 1 TABLET BY MOUTH EVERY 8 HOURS AS NEEDED FOR PAIN TABLET ORAL
Qty: 90 | Refills: 0 | Status: ON HOLD | COMMUNITY
Start: 2020-07-08

## 2021-11-10 RX ORDER — BUSPIRONE HYDROCHLORIDE 10 MG/1
1 TABLET PRN TABLET ORAL
Status: ACTIVE | COMMUNITY

## 2021-11-10 RX ORDER — DOXYCYCLINE 100 MG/1
TAKE 1 CAPSULE BY MOUTH TWICE A DAY CAPSULE ORAL
Qty: 20 | Refills: 0 | Status: ACTIVE | COMMUNITY
Start: 2020-04-25

## 2021-11-10 RX ORDER — LINACLOTIDE 145 UG/1
1 CAPSULE AT LEAST 30 MINUTES BEFORE THE FIRST MEAL OF THE DAY ON AN EMPTY STOMACH CAPSULE, GELATIN COATED ORAL ONCE A DAY
Status: ACTIVE | COMMUNITY
Start: 2021-06-29

## 2021-11-10 RX ORDER — HYDROXYZINE HYDROCHLORIDE 10 MG/1
1 TABLET TABLET, FILM COATED ORAL ONCE DAILY
Status: ACTIVE | COMMUNITY

## 2021-11-10 RX ORDER — GABAPENTIN 300 MG/1
3 CAPSULE CAPSULE ORAL ONCE A DAY
Status: ACTIVE | COMMUNITY

## 2021-11-10 RX ORDER — POLYETHYLENE GLYCOL 3350, SODIUM CHLORIDE, SODIUM BICARBONATE, POTASSIUM CHLORIDE 420; 11.2; 5.72; 1.48 G/4L; G/4L; G/4L; G/4L
AS DIRECTED POWDER, FOR SOLUTION ORAL
Qty: 420 GM | Refills: 0 | OUTPATIENT
Start: 2021-11-10 | End: 2021-11-11

## 2021-11-10 RX ORDER — LINACLOTIDE 145 UG/1
1 CAPSULE AT LEAST 30 MINUTES BEFORE THE FIRST MEAL OF THE DAY ON AN EMPTY STOMACH CAPSULE, GELATIN COATED ORAL ONCE A DAY
Qty: 90 | Refills: 3

## 2021-11-10 RX ORDER — SPIRONOLACTONE 100 MG/1
1 TABLET TABLET, FILM COATED ORAL ONCE A DAY
Status: ACTIVE | COMMUNITY

## 2021-11-10 RX ORDER — ADALIMUMAB 40MG/0.8ML
INJECT 40MG SUBCUTANEOUSLY *ONCE PER WEEK AS DIRECTED KIT SUBCUTANEOUS
OUTPATIENT

## 2021-11-10 RX ORDER — LISDEXAMFETAMINE DIMESYLATE 30 MG/1
1 CAPSULE IN THE MORNING CAPSULE ORAL ONCE A DAY
Status: ACTIVE | COMMUNITY

## 2021-11-10 RX ORDER — DAPSONE 100 MG/1
1 TABLET TABLET ORAL ONCE A DAY
Status: ACTIVE | COMMUNITY

## 2021-11-10 NOTE — HPI-TODAY'S VISIT:
Ms. Medel is a 41-year-old woman with a history of ulcerative proctitis, hidradenitis suppurativa, pyoderma gangrenosum on Humira 40 mg weekly and constipation, presenting for follow-up regarding colonoscopy. She was last seen on 9/3/2021 for follow-up regarding constipation.  Symptoms are doing well on Linzess 145 mcg once daily.  Regarding her history of ulcerative rectosigmoiditis, she was to continue Humira.  Routine laboratory testing was ordered at that time as well.  She was also planned for surveillance colonoscopy. Labs (9/3/2021): TIBC 238, iron 22, iron saturation 36%, ferritin 77, vitamin D 27.6, unremarkable CMP with normal LFTs.  CBC  demonstrated WBC 5.1, RBC 3.17, hemoglobin 10.6, hematocrit 32.3, , platelets 231. Colonoscopy (10/18/2021): [place] bowel preparation scale score of 6.  The colon preparation was noted to be fair.  No sign colitis.  Normal colon and terminal ileum (a suboptimal bowel prep).  Repeat colonoscopy recommended in 1 year.  She states that she is doing very well today. Her constipation is fairly well controlled on Linzess 145 mcg daily and MiraLAX as needed. On occasion, she reports exacerbations in constipation, but this is typically resolved with increased fluid intake and dietary modifications. No red blood per rectum, melena or hematochezia. Denies any upper GI complaints of difficulty swallowing, regurgitation, odynophagia and nocturnal heartburn. Her weight remains stable. Denies any abdominal pain, nausea, vomiting, fevers or chills.  Interval history is notable for carpal tunnel surgery. She is currently undergoing physical therapy for this.

## 2022-01-05 ENCOUNTER — ERX REFILL RESPONSE (OUTPATIENT)
Dept: URBAN - METROPOLITAN AREA CLINIC 113 | Facility: CLINIC | Age: 42
End: 2022-01-05

## 2022-01-05 RX ORDER — POLYETHYLENE GLYCOL 3350, SODIUM CHLORIDE, SODIUM BICARBONATE AND POTASSIUM CHLORIDE WITH LEMON FLAVOR 420; 11.2; 5.72; 1.48 G/4L; G/4L; G/4L; G/4L
FOLLOW PHYSICIAN INSTRUCTIONS POWDER, FOR SOLUTION ORAL
Qty: 4000 MILLILITER | Refills: 0 | OUTPATIENT

## 2022-01-05 RX ORDER — POLYETHYLENE GLYCOL 3350, SODIUM CHLORIDE, SODIUM BICARBONATE AND POTASSIUM CHLORIDE WITH LEMON FLAVOR 420; 11.2; 5.72; 1.48 G/4L; G/4L; G/4L; G/4L
FOLLOW PHYSICIAN INSTRUCTIONS POWDER, FOR SOLUTION ORAL
Qty: 4000 MILLILITER | Refills: 1 | OUTPATIENT

## 2022-05-10 ENCOUNTER — OFFICE VISIT (OUTPATIENT)
Dept: URBAN - METROPOLITAN AREA CLINIC 113 | Facility: CLINIC | Age: 42
End: 2022-05-10

## 2023-09-12 ENCOUNTER — TELEPHONE ENCOUNTER (OUTPATIENT)
Dept: URBAN - METROPOLITAN AREA CLINIC 113 | Facility: CLINIC | Age: 43
End: 2023-09-12

## 2023-09-14 ENCOUNTER — WEB ENCOUNTER (OUTPATIENT)
Dept: URBAN - METROPOLITAN AREA CLINIC 113 | Facility: CLINIC | Age: 43
End: 2023-09-14

## 2023-09-14 ENCOUNTER — OFFICE VISIT (OUTPATIENT)
Dept: URBAN - METROPOLITAN AREA CLINIC 113 | Facility: CLINIC | Age: 43
End: 2023-09-14

## 2023-11-22 ENCOUNTER — OFFICE VISIT (OUTPATIENT)
Dept: URBAN - METROPOLITAN AREA CLINIC 113 | Facility: CLINIC | Age: 43
End: 2023-11-22

## 2023-11-22 ENCOUNTER — OFFICE VISIT (OUTPATIENT)
Dept: URBAN - METROPOLITAN AREA CLINIC 113 | Facility: CLINIC | Age: 43
End: 2023-11-22
Payer: COMMERCIAL

## 2023-11-22 ENCOUNTER — LAB OUTSIDE AN ENCOUNTER (OUTPATIENT)
Dept: URBAN - METROPOLITAN AREA CLINIC 113 | Facility: CLINIC | Age: 43
End: 2023-11-22

## 2023-11-22 VITALS
WEIGHT: 183.4 LBS | TEMPERATURE: 97.1 F | SYSTOLIC BLOOD PRESSURE: 118 MMHG | HEIGHT: 65 IN | RESPIRATION RATE: 18 BRPM | HEART RATE: 94 BPM | DIASTOLIC BLOOD PRESSURE: 91 MMHG | BODY MASS INDEX: 30.56 KG/M2

## 2023-11-22 DIAGNOSIS — Z12.11 COLON CANCER SCREENING: ICD-10-CM

## 2023-11-22 DIAGNOSIS — K59.01 SLOW TRANSIT CONSTIPATION: ICD-10-CM

## 2023-11-22 DIAGNOSIS — K51.20 ULCERATIVE PROCTITIS WITHOUT COMPLICATION: ICD-10-CM

## 2023-11-22 DIAGNOSIS — R19.4 ALTERED BOWEL HABITS: ICD-10-CM

## 2023-11-22 PROBLEM — 10811000202109: Status: ACTIVE | Noted: 2023-11-22

## 2023-11-22 PROCEDURE — 99214 OFFICE O/P EST MOD 30 MIN: CPT

## 2023-11-22 RX ORDER — DAPSONE 100 MG/1
1 TABLET TABLET ORAL ONCE A DAY
Status: ACTIVE | COMMUNITY

## 2023-11-22 RX ORDER — LINACLOTIDE 145 UG/1
1 CAPSULE AT LEAST 30 MINUTES BEFORE THE FIRST MEAL OF THE DAY ON AN EMPTY STOMACH CAPSULE, GELATIN COATED ORAL ONCE A DAY
Qty: 90 | Refills: 3 | Status: ACTIVE | COMMUNITY

## 2023-11-22 RX ORDER — SPIRONOLACTONE 100 MG/1
1 TABLET TABLET, FILM COATED ORAL ONCE A DAY
Status: ACTIVE | COMMUNITY

## 2023-11-22 RX ORDER — POLYETHYLENE GLYCOL 3350, SODIUM CHLORIDE, SODIUM BICARBONATE, POTASSIUM CHLORIDE 420; 11.2; 5.72; 1.48 G/4L; G/4L; G/4L; G/4L
2000 ML POWDER, FOR SOLUTION ORAL
Qty: 4000 ML | Refills: 0 | OUTPATIENT
Start: 2023-11-22 | End: 2023-11-24

## 2023-11-22 RX ORDER — POLYETHYLENE GLYCOL 3350 17 G/17G
1 SCOOP MIXED WITH 8 OUNCES OF FLUID POWDER, FOR SOLUTION ORAL ONCE A DAY
Status: ACTIVE | COMMUNITY

## 2023-11-22 RX ORDER — CHLORTHALIDONE 25 MG/1
1 TABLET IN THE MORNING WITH FOOD TABLET ORAL ONCE A DAY
Status: ACTIVE | COMMUNITY

## 2023-11-22 RX ORDER — ADALIMUMAB 40MG/0.8ML
INJECT 40MG SUBCUTANEOUSLY *ONCE PER WEEK AS DIRECTED KIT SUBCUTANEOUS
Status: ACTIVE | COMMUNITY

## 2023-11-22 NOTE — HPI-TODAY'S VISIT:
Ms. Medel is a 43-year-old woman with a history of ulcerative proctitis, hidradenitis suppurativa, pyoderma gangrenosum on Humira 40 mg weekly and constipation, presenting for follow-up regarding colonoscopy.  She was last seen 11/10/2021 and reported her constipation was improved with Linzess 145 mcg daily and miralax as needed.  She had colonoscopy on 10/18/2021 that revealed BBPS 6 (suboptimal bowel prep), normal colon and terminal ileum. It was recommended to repeat colonoscopy in 1 year.  Labs (9/3/2021): TIBC 238, iron 22, iron saturation 36%, ferritin 77, vitamin D 27.6, unremarkable CMP with normal LFTs.  CBC  demonstrated WBC 5.1, RBC 3.17, hemoglobin 10.6, hematocrit 32.3, , platelets 231.  She states that in September she experienced 2 weeks of loose, urgent bowel movements, abdominal cramping, and acid reflux. She trialed pepto bismol and a PPI her father had, that alleviated the reflux. It has since resolved. She attributes it to stress at the time. She reports she is now having infrequent bowel movements going one time a week. She feels the Linzess 145 mcg stopped working, she tried taking 2 capsules that didn't provide any relief. She reports she is using her Humira as needed. Denies any upper GI complaints of difficulty swallowing, regurgitation, odynophagia and nocturnal heartburn. Her weight remains stable. Denies any abdominal pain, nausea, vomiting, fevers or chills. 17-May-2019 13:26

## 2023-11-22 NOTE — HPI-OTHER HISTORIES
Colonoscopy (10/18/2021): [place] bowel preparation scale score of 6. The colon preparation was noted to be fair. No sign colitis. Normal colon and terminal ileum (a suboptimal bowel prep). Repeat colonoscopy recommended in 1 year. Colonoscopy (1/7/10): diffuse rectosigmoid inflammation, biopsied showing chronic acitve colitis with ulceration, no dysplasia.

## 2023-11-29 ENCOUNTER — TELEPHONE ENCOUNTER (OUTPATIENT)
Dept: URBAN - METROPOLITAN AREA CLINIC 113 | Facility: CLINIC | Age: 43
End: 2023-11-29

## 2023-11-29 LAB
A/G RATIO: 1.4
ABSOLUTE BASOPHILS: 33
ABSOLUTE EOSINOPHILS: 112
ABSOLUTE LYMPHOCYTES: 1973
ABSOLUTE MONOCYTES: 601
ABSOLUTE NEUTROPHILS: 3881
ALBUMIN: 4.1
ALKALINE PHOSPHATASE: 90
ALT (SGPT): 31
AST (SGOT): 24
BASOPHILS: 0.5
BILIRUBIN, TOTAL: 0.4
BUN/CREATININE RATIO: (no result)
BUN: 13
C-REACTIVE PROTEIN, QUANT: 5.6
CALCIUM: 8.8
CARBON DIOXIDE, TOTAL: 25
CHLORIDE: 106
CREATININE: 0.79
EGFR: 95
EOSINOPHILS: 1.7
GLOBULIN, TOTAL: 3
GLUCOSE: 70
HEMATOCRIT: 31.1
HEMOGLOBIN: 10.1
LYMPHOCYTES: 29.9
MCH: 33
MCHC: 32.5
MCV: 101.6
MITOGEN-NIL: >10
MONOCYTES: 9.1
MPV: 10.3
NEUTROPHILS: 58.8
PLATELET COUNT: 363
POTASSIUM: 3.7
PROTEIN, TOTAL: 7.1
QUANTIFERON NIL VALUE: 0.03
QUANTIFERON TB1 AG VALUE: 0.02
QUANTIFERON TB2 AG VALUE: 0
QUANTIFERON-TB GOLD PLUS: NEGATIVE
RDW: 11.4
RED BLOOD CELL COUNT: 3.06
SED RATE BY MODIFIED: 31
SODIUM: 139
VITAMIN D,25-OH,TOTAL,IA: 18
WHITE BLOOD CELL COUNT: 6.6

## 2023-12-25 ENCOUNTER — ERX REFILL RESPONSE (OUTPATIENT)
Dept: URBAN - METROPOLITAN AREA CLINIC 113 | Facility: CLINIC | Age: 43
End: 2023-12-25

## 2023-12-25 RX ORDER — POLYETHYLENE GLYCOL 3350, SODIUM CHLORIDE, SODIUM BICARBONATE, POTASSIUM CHLORIDE 420; 11.2; 5.72; 1.48 G/4L; G/4L; G/4L; G/4L
AS DIRECTED POWDER, FOR SOLUTION ORAL ONCE
Qty: 1 KIT | Refills: 0 | OUTPATIENT

## 2023-12-25 RX ORDER — POLYETHYLENE GLYCOL 3350, SODIUM CHLORIDE, SODIUM BICARBONATE AND POTASSIUM CHLORIDE WITH LEMON FLAVOR 420; 11.2; 5.72; 1.48 G/4L; G/4L; G/4L; G/4L
TAKE 2000ML BY MOUTH TWICE AS DIRECTED FOR PREP POWDER, FOR SOLUTION ORAL
Qty: 4000 MILLILITER | Refills: 0 | OUTPATIENT

## 2023-12-28 ENCOUNTER — CLAIMS CREATED FROM THE CLAIM WINDOW (OUTPATIENT)
Dept: URBAN - METROPOLITAN AREA SURGERY CENTER 25 | Facility: SURGERY CENTER | Age: 43
End: 2023-12-28
Payer: COMMERCIAL

## 2023-12-28 ENCOUNTER — CLAIMS CREATED FROM THE CLAIM WINDOW (OUTPATIENT)
Dept: URBAN - METROPOLITAN AREA CLINIC 4 | Facility: CLINIC | Age: 43
End: 2023-12-28
Payer: COMMERCIAL

## 2023-12-28 DIAGNOSIS — Z12.11 COLON CANCER SCREENING (HIGH RISK): ICD-10-CM

## 2023-12-28 DIAGNOSIS — K51.20 ULCERATIVE (CHRONIC) PROCTITIS WITHOUT COMPLICATIONS: ICD-10-CM

## 2023-12-28 DIAGNOSIS — K64.0 FIRST DEGREE HEMORRHOIDS: ICD-10-CM

## 2023-12-28 DIAGNOSIS — K63.89 OTHER SPECIFIED DISEASES OF INTESTINE: ICD-10-CM

## 2023-12-28 DIAGNOSIS — D12.3 ADENOMATOUS POLYP OF TRANSVERSE COLON: ICD-10-CM

## 2023-12-28 DIAGNOSIS — K51.20 ACUTE ULCERATIVE PROCTITIS: ICD-10-CM

## 2023-12-28 DIAGNOSIS — K63.89 APPENDICITIS EPIPLOICA: ICD-10-CM

## 2023-12-28 PROCEDURE — 00811 ANES LWR INTST NDSC NOS: CPT | Performed by: NURSE ANESTHETIST, CERTIFIED REGISTERED

## 2023-12-28 PROCEDURE — 88305 TISSUE EXAM BY PATHOLOGIST: CPT | Performed by: PATHOLOGY

## 2023-12-28 PROCEDURE — 45380 COLONOSCOPY AND BIOPSY: CPT | Performed by: INTERNAL MEDICINE

## 2023-12-28 PROCEDURE — G8907 PT DOC NO EVENTS ON DISCHARG: HCPCS | Performed by: INTERNAL MEDICINE

## 2023-12-28 PROCEDURE — 00811 ANES LWR INTST NDSC NOS: CPT | Performed by: ANESTHESIOLOGY

## 2023-12-28 RX ORDER — CHLORTHALIDONE 25 MG/1
1 TABLET IN THE MORNING WITH FOOD TABLET ORAL ONCE A DAY
Status: ACTIVE | COMMUNITY

## 2023-12-28 RX ORDER — ADALIMUMAB 40MG/0.8ML
INJECT 40MG SUBCUTANEOUSLY *ONCE PER WEEK AS DIRECTED KIT SUBCUTANEOUS
Status: ACTIVE | COMMUNITY

## 2023-12-28 RX ORDER — POLYETHYLENE GLYCOL 3350, SODIUM CHLORIDE, SODIUM BICARBONATE, POTASSIUM CHLORIDE 420; 11.2; 5.72; 1.48 G/4L; G/4L; G/4L; G/4L
AS DIRECTED POWDER, FOR SOLUTION ORAL ONCE
Qty: 1 KIT | Refills: 0 | Status: ACTIVE | COMMUNITY

## 2023-12-28 RX ORDER — SPIRONOLACTONE 100 MG/1
1 TABLET TABLET, FILM COATED ORAL ONCE A DAY
Status: ACTIVE | COMMUNITY

## 2023-12-28 RX ORDER — POLYETHYLENE GLYCOL 3350 17 G/17G
1 SCOOP MIXED WITH 8 OUNCES OF FLUID POWDER, FOR SOLUTION ORAL ONCE A DAY
Status: ACTIVE | COMMUNITY

## 2023-12-28 RX ORDER — LINACLOTIDE 145 UG/1
1 CAPSULE AT LEAST 30 MINUTES BEFORE THE FIRST MEAL OF THE DAY ON AN EMPTY STOMACH CAPSULE, GELATIN COATED ORAL ONCE A DAY
Qty: 90 | Refills: 3 | Status: ACTIVE | COMMUNITY

## 2023-12-28 RX ORDER — DAPSONE 100 MG/1
1 TABLET TABLET ORAL ONCE A DAY
Status: ACTIVE | COMMUNITY

## 2024-01-22 ENCOUNTER — DASHBOARD ENCOUNTERS (OUTPATIENT)
Age: 44
End: 2024-01-22

## 2024-01-22 ENCOUNTER — OFFICE VISIT (OUTPATIENT)
Dept: URBAN - METROPOLITAN AREA CLINIC 113 | Facility: CLINIC | Age: 44
End: 2024-01-22
Payer: COMMERCIAL

## 2024-01-22 VITALS
DIASTOLIC BLOOD PRESSURE: 104 MMHG | HEIGHT: 65 IN | TEMPERATURE: 97.1 F | HEART RATE: 99 BPM | BODY MASS INDEX: 29.99 KG/M2 | WEIGHT: 180 LBS | SYSTOLIC BLOOD PRESSURE: 128 MMHG | RESPIRATION RATE: 16 BRPM

## 2024-01-22 DIAGNOSIS — K51.20 ULCERATIVE PROCTITIS WITHOUT COMPLICATION: ICD-10-CM

## 2024-01-22 DIAGNOSIS — R19.4 ALTERED BOWEL HABITS: ICD-10-CM

## 2024-01-22 DIAGNOSIS — K12.0 CANKER SORES ORAL: ICD-10-CM

## 2024-01-22 DIAGNOSIS — Z12.11 COLON CANCER SCREENING: ICD-10-CM

## 2024-01-22 PROCEDURE — 99214 OFFICE O/P EST MOD 30 MIN: CPT

## 2024-01-22 RX ORDER — ADALIMUMAB 40MG/0.8ML
INJECT 40MG SUBCUTANEOUSLY *ONCE PER WEEK AS DIRECTED KIT SUBCUTANEOUS
Status: ACTIVE | COMMUNITY

## 2024-01-22 RX ORDER — LINACLOTIDE 145 UG/1
1 CAPSULE AT LEAST 30 MINUTES BEFORE THE FIRST MEAL OF THE DAY ON AN EMPTY STOMACH CAPSULE, GELATIN COATED ORAL ONCE A DAY
Qty: 90 | Refills: 3 | Status: ACTIVE | COMMUNITY

## 2024-01-22 RX ORDER — SPIRONOLACTONE 100 MG/1
1 TABLET TABLET, FILM COATED ORAL ONCE A DAY
Status: ACTIVE | COMMUNITY

## 2024-01-22 RX ORDER — DAPSONE 100 MG/1
1 TABLET TABLET ORAL ONCE A DAY
Status: ACTIVE | COMMUNITY

## 2024-01-22 RX ORDER — CHLORTHALIDONE 25 MG/1
1 TABLET IN THE MORNING WITH FOOD TABLET ORAL ONCE A DAY
Status: ACTIVE | COMMUNITY

## 2024-01-22 NOTE — HPI-TODAY'S VISIT:
Ms. Medel is a 43-year-old woman with a history of ulcerative proctitis, hidradenitis suppurativa, pyoderma gangrenosum, and constipation on Humira 80 mg biweekly presenting for follow-up for rectal pain. She was last seen 11/22/2023 and reported that in September had been having diarrhea with blood, abdominal cramping and acid reflux.  That had resolved and she was not treated constipation.  She thought her Linzess had stopped working.  We trialed Ibsrela 50 mg twice daily.  Also recommend she start daily fiber supplement and MiraLAX half to 1 capful daily.  She is also past due for her recommended 1 year repeat colonoscopy.  This was scheduled.  At this visit she also reported that she was taking Humira on as-needed basis she was due for labs so these were also ordered.  Her labs showed elevated ESR 31, normal CRP, low vitamin D 18 negative TB, mildly elevated ALT 31, low RBC count 3.06, low hemoglobin 10.1, low hematocrit 31.1, elevated .6, platelets  363,000. Colonoscopy completed on 12/28/2023 showed BBPS of 7, one 3 mm polyp in the transverse colon, scar in the distal rectum likely from hemorrhoidectomy.  Nonbleeding, hemorrhoids, grade 1.  No signs of active ulcerative is.  Pathology revealed benign mucosal polyps.  She was recommended repeat colonoscopy in 3 years for surveillance purposes. Reports last week she started experiencing abdominal cramping. She tried the antispasmodic and it was helpful. She reports lumps in her mouth especially on her tongue. She states she took her sons left over amoxicillin that was helpful. Bowels are moving regularly with the Ibsrela. There is no blood per rectum or melena. Her weight has been stable. She has been feeling very fatigued We discussed this could be due to her anemia and/or vitamin D deficiency. She denies reflux, heartburn, nausea, vomiting, or hematemesis.

## 2024-04-24 ENCOUNTER — OV EP (OUTPATIENT)
Dept: URBAN - METROPOLITAN AREA CLINIC 113 | Facility: CLINIC | Age: 44
End: 2024-04-24